# Patient Record
Sex: MALE | Race: ASIAN
[De-identification: names, ages, dates, MRNs, and addresses within clinical notes are randomized per-mention and may not be internally consistent; named-entity substitution may affect disease eponyms.]

---

## 2020-01-20 ENCOUNTER — TRANSCRIPTION ENCOUNTER (OUTPATIENT)
Age: 30
End: 2020-01-20

## 2020-02-28 ENCOUNTER — APPOINTMENT (OUTPATIENT)
Dept: INTERNAL MEDICINE | Facility: CLINIC | Age: 30
End: 2020-02-28
Payer: COMMERCIAL

## 2020-02-28 VITALS
SYSTOLIC BLOOD PRESSURE: 109 MMHG | HEIGHT: 71.26 IN | HEART RATE: 87 BPM | BODY MASS INDEX: 24.78 KG/M2 | DIASTOLIC BLOOD PRESSURE: 67 MMHG | TEMPERATURE: 97.4 F | OXYGEN SATURATION: 99 % | WEIGHT: 179 LBS

## 2020-02-28 DIAGNOSIS — Z78.9 OTHER SPECIFIED HEALTH STATUS: ICD-10-CM

## 2020-02-28 DIAGNOSIS — Z80.0 FAMILY HISTORY OF MALIGNANT NEOPLASM OF DIGESTIVE ORGANS: ICD-10-CM

## 2020-02-28 DIAGNOSIS — Z81.8 FAMILY HISTORY OF OTHER MENTAL AND BEHAVIORAL DISORDERS: ICD-10-CM

## 2020-02-28 DIAGNOSIS — H65.90 UNSPECIFIED NONSUPPURATIVE OTITIS MEDIA, UNSPECIFIED EAR: ICD-10-CM

## 2020-02-28 DIAGNOSIS — Z82.49 FAMILY HISTORY OF ISCHEMIC HEART DISEASE AND OTHER DISEASES OF THE CIRCULATORY SYSTEM: ICD-10-CM

## 2020-02-28 DIAGNOSIS — Z72.89 OTHER PROBLEMS RELATED TO LIFESTYLE: ICD-10-CM

## 2020-02-28 PROCEDURE — 99203 OFFICE O/P NEW LOW 30 MIN: CPT

## 2020-02-28 RX ORDER — ERYTHROMYCIN 5 MG/G
5 OINTMENT OPHTHALMIC
Qty: 4 | Refills: 0 | Status: COMPLETED | COMMUNITY
Start: 2020-01-21

## 2020-02-28 RX ORDER — FLUTICASONE PROPIONATE 50 UG/1
50 SPRAY, METERED NASAL TWICE DAILY
Qty: 1 | Refills: 0 | Status: ACTIVE | COMMUNITY
Start: 2020-02-28 | End: 1900-01-01

## 2020-02-28 RX ORDER — NEOMYCIN AND POLYMYXIN B SULFATES AND HYDROCORTISONE OTIC 10; 3.5; 1 MG/ML; MG/ML; [USP'U]/ML
3.5-10000-1 SUSPENSION AURICULAR (OTIC)
Qty: 10 | Refills: 0 | Status: COMPLETED | COMMUNITY
Start: 2019-10-31

## 2020-02-28 NOTE — REVIEW OF SYSTEMS
[Earache] : no earache [Hearing Loss] : no hearing loss [Negative] : Heme/Lymph [FreeTextEntry4] : tinnitus

## 2020-02-28 NOTE — PHYSICAL EXAM
[Normal Anterior Cervical Nodes] : no anterior cervical lymphadenopathy [Normal Posterior Cervical Nodes] : no posterior cervical lymphadenopathy [Normal] : affect was normal and insight and judgment were intact [de-identified] : no cerumen impaction

## 2020-02-28 NOTE — HISTORY OF PRESENT ILLNESS
[FreeTextEntry8] : 30 year old male patienrt came in with the complaint of ringing in the ear. \par Patient had similar complains last year, was found to have cerumen impaction, improved after removal. This episode started 2 months ago, stable, denies hearing loss, ear pain, discharge from ear. He admits to flying on airplane from [place] over a week ago.

## 2020-07-15 ENCOUNTER — APPOINTMENT (OUTPATIENT)
Dept: INTERNAL MEDICINE | Facility: CLINIC | Age: 30
End: 2020-07-15
Payer: COMMERCIAL

## 2020-07-15 ENCOUNTER — TRANSCRIPTION ENCOUNTER (OUTPATIENT)
Age: 30
End: 2020-07-15

## 2020-07-15 DIAGNOSIS — H00.014 HORDEOLUM EXTERNUM LEFT UPPER EYELID: ICD-10-CM

## 2020-07-15 PROCEDURE — 99213 OFFICE O/P EST LOW 20 MIN: CPT | Mod: 95

## 2020-07-15 RX ORDER — NEOMYCIN AND POLYMYXIN B SULFATES AND BACITRACIN ZINC 400; 3.5; 1 [USP'U]/G; MG/G; [USP'U]/G
3.5-400-1 OINTMENT OPHTHALMIC
Qty: 1 | Refills: 0 | Status: ACTIVE | COMMUNITY
Start: 2020-07-15 | End: 1900-01-01

## 2020-07-15 NOTE — HISTORY OF PRESENT ILLNESS
[Medical Office: ([facility])___] : at the medical office located in  [Other Location: e.g. School (Enter Location, City,State)___] : at [unfilled], at the time of the visit. [de-identified] : 30 year old male patient on televideo with lesion on eyelid/stye.\par Started 2 days ago, located on left upper eyelid, associated with redness, progressively getting biggre, patient has been using warm compresses on it but has been getting bigger, denies vision problems.  [Verbal consent obtained from patient] : the patient, [unfilled]

## 2020-07-17 ENCOUNTER — TRANSCRIPTION ENCOUNTER (OUTPATIENT)
Age: 30
End: 2020-07-17

## 2020-12-30 ENCOUNTER — NON-APPOINTMENT (OUTPATIENT)
Age: 30
End: 2020-12-30

## 2020-12-30 ENCOUNTER — APPOINTMENT (OUTPATIENT)
Dept: INTERNAL MEDICINE | Facility: CLINIC | Age: 30
End: 2020-12-30
Payer: COMMERCIAL

## 2020-12-30 VITALS
TEMPERATURE: 98.2 F | SYSTOLIC BLOOD PRESSURE: 114 MMHG | HEIGHT: 71.25 IN | DIASTOLIC BLOOD PRESSURE: 73 MMHG | BODY MASS INDEX: 25.61 KG/M2 | OXYGEN SATURATION: 99 % | WEIGHT: 185 LBS | HEART RATE: 91 BPM

## 2020-12-30 DIAGNOSIS — Z00.00 ENCOUNTER FOR GENERAL ADULT MEDICAL EXAMINATION W/OUT ABNORMAL FINDINGS: ICD-10-CM

## 2020-12-30 DIAGNOSIS — R09.81 NASAL CONGESTION: ICD-10-CM

## 2020-12-30 DIAGNOSIS — Z11.3 ENCOUNTER FOR SCREENING FOR INFECTIONS WITH A PREDOMINANTLY SEXUAL MODE OF TRANSMISSION: ICD-10-CM

## 2020-12-30 PROCEDURE — 99072 ADDL SUPL MATRL&STAF TM PHE: CPT

## 2020-12-30 PROCEDURE — 36415 COLL VENOUS BLD VENIPUNCTURE: CPT

## 2020-12-30 PROCEDURE — 99213 OFFICE O/P EST LOW 20 MIN: CPT | Mod: 25

## 2020-12-30 PROCEDURE — 93000 ELECTROCARDIOGRAM COMPLETE: CPT

## 2020-12-30 PROCEDURE — 99395 PREV VISIT EST AGE 18-39: CPT | Mod: 25

## 2020-12-31 ENCOUNTER — APPOINTMENT (OUTPATIENT)
Dept: OTOLARYNGOLOGY | Facility: CLINIC | Age: 30
End: 2020-12-31
Payer: COMMERCIAL

## 2020-12-31 VITALS
SYSTOLIC BLOOD PRESSURE: 104 MMHG | WEIGHT: 185 LBS | HEART RATE: 91 BPM | BODY MASS INDEX: 25.9 KG/M2 | HEIGHT: 71 IN | TEMPERATURE: 98.1 F | DIASTOLIC BLOOD PRESSURE: 64 MMHG | OXYGEN SATURATION: 98 %

## 2020-12-31 DIAGNOSIS — R19.6 HALITOSIS: ICD-10-CM

## 2020-12-31 DIAGNOSIS — H93.13 TINNITUS, BILATERAL: ICD-10-CM

## 2020-12-31 DIAGNOSIS — J34.2 DEVIATED NASAL SEPTUM: ICD-10-CM

## 2020-12-31 DIAGNOSIS — H61.23 IMPACTED CERUMEN, BILATERAL: ICD-10-CM

## 2020-12-31 DIAGNOSIS — Q30.9 CONGENITAL MALFORMATION OF NOSE, UNSPECIFIED: ICD-10-CM

## 2020-12-31 PROBLEM — R09.81 NASAL CONGESTION: Status: ACTIVE | Noted: 2020-12-30

## 2020-12-31 PROCEDURE — 92557 COMPREHENSIVE HEARING TEST: CPT

## 2020-12-31 PROCEDURE — 31231 NASAL ENDOSCOPY DX: CPT

## 2020-12-31 PROCEDURE — 92550 TYMPANOMETRY & REFLEX THRESH: CPT

## 2020-12-31 PROCEDURE — 99204 OFFICE O/P NEW MOD 45 MIN: CPT | Mod: 25

## 2020-12-31 PROCEDURE — 99072 ADDL SUPL MATRL&STAF TM PHE: CPT

## 2020-12-31 PROCEDURE — G0268 REMOVAL OF IMPACTED WAX MD: CPT

## 2020-12-31 RX ORDER — FLUTICASONE PROPIONATE 50 UG/1
50 SPRAY, METERED NASAL
Qty: 1 | Refills: 5 | Status: ACTIVE | COMMUNITY
Start: 2020-12-31 | End: 1900-01-01

## 2020-12-31 NOTE — CONSULT LETTER
[Dear  ___] : Dear  [unfilled], [Consult Letter:] : I had the pleasure of evaluating your patient, [unfilled]. [Please see my note below.] : Please see my note below. [Consult Closing:] : Thank you very much for allowing me to participate in the care of this patient.  If you have any questions, please do not hesitate to contact me. [Sincerely,] : Sincerely, [FreeTextEntry3] : SANDRA Lynn Jr, MD, FAAOHNS\par Otolaryngologist\par [facility]

## 2020-12-31 NOTE — PROCEDURE
[FreeTextEntry6] : We discussed the elevated risk of liberation of viral particles from the nasopharynx if the patient were to be asymptomatically infected with COVID-19; after weighing the risks & benefits the decision was made to proceed. The procedure was performed while wearing appropriate PPE and a camera attached to a video system was used to maximize separation from the patient. The scope was handled appropriately. \par Indication: requirement for exam not possible via anterior rhinoscopy; severe nasal obstruction & halitosis\par After verbal consent and the administration of an aerosolized phenylephrine/lidocaine mix, examination was performed with a flexible endoscope. Findings:\par Septum: wide R DNS impacting sidewall structures\par Mucosa: normal\par Polyposis: not present\par Inferior turbinates: unremarkable\par Middle and superior turbinates: normal\par Inferior meatus: unremarkable\par Middle meatus: unremarkable\par Superior meatus: unremarkable\par Speno-ethmoidal recess: unremarkable\par Nasopharynx: unremarkable\par Secretions: unremarkable\par Other findings: none\par  [de-identified] : Indication: requirement for exam not possible via anterior examination\par After verbal consent and the administration of an aerosolized phenylephrine/lidocaine mix, examination was performed with a flexible endoscope placed through the nose. Findings:\par Nasopharynx: unremarkable\par Soft palate, lateral and posterior pharyngeal walls: unremarkable\par Base of tongue & lingual tonsil: minimal retrodisplacement\par Vallecula: unremarkable\par Epiglottis: unremarkable\par Piriform sinuses and pharyngoesophageal junction: unremarkable\par Arytenoids and AE folds: mild-mod interarytenoid edema\par Ventricle and false vocal folds: unremarkable\par True vocal folds: Smooth free edge; surface without ectasias or edema; normal movement bilaterally with good apposition in phonation\par Visible subglottis: unremarkable\par Other findings: none\par

## 2020-12-31 NOTE — HISTORY OF PRESENT ILLNESS
[de-identified] : 30 year old male patient came in for preventive visit and concern for nasal congestion\par works as a \par , female partner, monogamous\par exercises but irregularly\par diet is healthy

## 2020-12-31 NOTE — HISTORY OF PRESENT ILLNESS
[de-identified] : R>L nonpulsatile tinnitus that began ~11/19 which improved after a cleaning but then recurred and he now hears it daily in quiet places. No subjective hearing loss. Denies: vertigo, ear pain, drainage, frequent loud noise exp/shooting, frequent infections, hx of ear surgery or IV antibiotics/chemo; denies a FHx of hearing loss. Qtip use: prior\par Also w/ alternating nasal congestion for years which improves w/ steam. No obvious allergy sxs. No frequent sinus infections or rhinorrhea but has some midfacial pressure. Lifelong twisted nasal appearance. \par Also c/o halitosis and a white colored tongue for many months. No significant heartburn. \par \par Covid-19 screening questions: \par   Sick contacts: no\par   Recent international travel: no\par   Shortness of breath: no\par   New or productive cough: no\par   Fevers/chills/night sweats: no\par   COVID-19 testing: none\par

## 2020-12-31 NOTE — HISTORY OF PRESENT ILLNESS
[de-identified] : R>L nonpulsatile tinnitus that began ~11/19 which improved after a cleaning but then recurred and he now hears it daily in quiet places. No subjective hearing loss. Denies: vertigo, ear pain, drainage, frequent loud noise exp/shooting, frequent infections, hx of ear surgery or IV antibiotics/chemo; denies a FHx of hearing loss. Qtip use: prior\par Also w/ alternating nasal congestion for years which improves w/ steam. No obvious allergy sxs. No frequent sinus infections or rhinorrhea but has some midfacial pressure. Lifelong twisted nasal appearance. \par Also c/o halitosis and a white colored tongue for many months. No significant heartburn. \par \par Covid-19 screening questions: \par   Sick contacts: no\par   Recent international travel: no\par   Shortness of breath: no\par   New or productive cough: no\par   Fevers/chills/night sweats: no\par   COVID-19 testing: none\par

## 2020-12-31 NOTE — PHYSICAL EXAM
[Binocular Microscopic Exam] : Binocular microscopic exam was performed [Nasal Endoscopy Performed] : nasal endoscopy was performed, see procedure section for findings [] : septum deviated to the right [Laryngoscopy Performed] : laryngoscopy was performed, see procedure section for findings [Normal] : no rashes [FreeTextEntry8] : obstructing cerumen removed with a loop [FreeTextEntry9] : obstructing cerumen removed with a loop [de-identified] : twisted apex left w/ mild bilat midvault pinching

## 2020-12-31 NOTE — ASSESSMENT
[FreeTextEntry1] : Trial flonase & RTC 1 month; discussed surgical options. \par RTC for any worsening or unilateral tinnitus. \par Discussed halitosis mgmt & reflux prevention

## 2020-12-31 NOTE — PROCEDURE
[FreeTextEntry6] : We discussed the elevated risk of liberation of viral particles from the nasopharynx if the patient were to be asymptomatically infected with COVID-19; after weighing the risks & benefits the decision was made to proceed. The procedure was performed while wearing appropriate PPE and a camera attached to a video system was used to maximize separation from the patient. The scope was handled appropriately. \par Indication: requirement for exam not possible via anterior rhinoscopy; severe nasal obstruction & halitosis\par After verbal consent and the administration of an aerosolized phenylephrine/lidocaine mix, examination was performed with a flexible endoscope. Findings:\par Septum: wide R DNS impacting sidewall structures\par Mucosa: normal\par Polyposis: not present\par Inferior turbinates: unremarkable\par Middle and superior turbinates: normal\par Inferior meatus: unremarkable\par Middle meatus: unremarkable\par Superior meatus: unremarkable\par Speno-ethmoidal recess: unremarkable\par Nasopharynx: unremarkable\par Secretions: unremarkable\par Other findings: none\par  [de-identified] : Indication: requirement for exam not possible via anterior examination\par After verbal consent and the administration of an aerosolized phenylephrine/lidocaine mix, examination was performed with a flexible endoscope placed through the nose. Findings:\par Nasopharynx: unremarkable\par Soft palate, lateral and posterior pharyngeal walls: unremarkable\par Base of tongue & lingual tonsil: minimal retrodisplacement\par Vallecula: unremarkable\par Epiglottis: unremarkable\par Piriform sinuses and pharyngoesophageal junction: unremarkable\par Arytenoids and AE folds: mild-mod interarytenoid edema\par Ventricle and false vocal folds: unremarkable\par True vocal folds: Smooth free edge; surface without ectasias or edema; normal movement bilaterally with good apposition in phonation\par Visible subglottis: unremarkable\par Other findings: none\par

## 2020-12-31 NOTE — CONSULT LETTER
[Dear  ___] : Dear  [unfilled], [Consult Letter:] : I had the pleasure of evaluating your patient, [unfilled]. [Please see my note below.] : Please see my note below. [Consult Closing:] : Thank you very much for allowing me to participate in the care of this patient.  If you have any questions, please do not hesitate to contact me. [Sincerely,] : Sincerely, [FreeTextEntry3] : SANDRA Lynn Jr, MD, FAAOHNS\par Otolaryngologist\par [facility]

## 2020-12-31 NOTE — PHYSICAL EXAM
[Binocular Microscopic Exam] : Binocular microscopic exam was performed [Nasal Endoscopy Performed] : nasal endoscopy was performed, see procedure section for findings [] : septum deviated to the right [Laryngoscopy Performed] : laryngoscopy was performed, see procedure section for findings [Normal] : no rashes [FreeTextEntry8] : obstructing cerumen removed with a loop [FreeTextEntry9] : obstructing cerumen removed with a loop [de-identified] : twisted apex left w/ mild bilat midvault pinching

## 2021-01-05 ENCOUNTER — TRANSCRIPTION ENCOUNTER (OUTPATIENT)
Age: 31
End: 2021-01-05

## 2021-01-05 LAB
ALBUMIN SERPL ELPH-MCNC: 4.6 G/DL
ALP BLD-CCNC: 62 U/L
ALT SERPL-CCNC: 20 U/L
ANION GAP SERPL CALC-SCNC: 13 MMOL/L
APPEARANCE: CLEAR
AST SERPL-CCNC: 20 U/L
BACTERIA: NEGATIVE
BASOPHILS # BLD AUTO: 0.07 K/UL
BASOPHILS NFR BLD AUTO: 1.3 %
BILIRUB SERPL-MCNC: 0.6 MG/DL
BILIRUBIN URINE: NEGATIVE
BLOOD URINE: NEGATIVE
BUN SERPL-MCNC: 12 MG/DL
C TRACH RRNA SPEC QL NAA+PROBE: NOT DETECTED
CALCIUM SERPL-MCNC: 10 MG/DL
CHLORIDE SERPL-SCNC: 99 MMOL/L
CHOLEST SERPL-MCNC: 245 MG/DL
CO2 SERPL-SCNC: 25 MMOL/L
COLOR: COLORLESS
CREAT SERPL-MCNC: 1.12 MG/DL
EOSINOPHIL # BLD AUTO: 0.06 K/UL
EOSINOPHIL NFR BLD AUTO: 1.1 %
ESTIMATED AVERAGE GLUCOSE: 100 MG/DL
GLUCOSE QUALITATIVE U: NEGATIVE
GLUCOSE SERPL-MCNC: 96 MG/DL
HBA1C MFR BLD HPLC: 5.1 %
HCT VFR BLD CALC: 44.9 %
HDLC SERPL-MCNC: 46 MG/DL
HGB BLD-MCNC: 14.9 G/DL
HIV1+2 AB SPEC QL IA.RAPID: NONREACTIVE
HYALINE CASTS: 0 /LPF
IMM GRANULOCYTES NFR BLD AUTO: 0.2 %
KETONES URINE: NEGATIVE
LDLC SERPL CALC-MCNC: 163 MG/DL
LEUKOCYTE ESTERASE URINE: NEGATIVE
LYMPHOCYTES # BLD AUTO: 1.64 K/UL
LYMPHOCYTES NFR BLD AUTO: 30.2 %
MAN DIFF?: NORMAL
MCHC RBC-ENTMCNC: 29.2 PG
MCHC RBC-ENTMCNC: 33.2 GM/DL
MCV RBC AUTO: 87.9 FL
MICROSCOPIC-UA: NORMAL
MONOCYTES # BLD AUTO: 0.3 K/UL
MONOCYTES NFR BLD AUTO: 5.5 %
N GONORRHOEA RRNA SPEC QL NAA+PROBE: NOT DETECTED
NEUTROPHILS # BLD AUTO: 3.35 K/UL
NEUTROPHILS NFR BLD AUTO: 61.7 %
NITRITE URINE: NEGATIVE
NONHDLC SERPL-MCNC: 199 MG/DL
PH URINE: 7
PLATELET # BLD AUTO: 216 K/UL
POTASSIUM SERPL-SCNC: 4.3 MMOL/L
PROT SERPL-MCNC: 6.8 G/DL
PROTEIN URINE: NEGATIVE
RBC # BLD: 5.11 M/UL
RBC # FLD: 11.2 %
RED BLOOD CELLS URINE: 0 /HPF
SODIUM SERPL-SCNC: 137 MMOL/L
SOURCE AMPLIFICATION: NORMAL
SPECIFIC GRAVITY URINE: 1
SQUAMOUS EPITHELIAL CELLS: 0 /HPF
T PALLIDUM AB SER QL IA: NEGATIVE
TRIGL SERPL-MCNC: 181 MG/DL
UROBILINOGEN URINE: NORMAL
WBC # FLD AUTO: 5.43 K/UL
WHITE BLOOD CELLS URINE: 0 /HPF

## 2021-01-25 ENCOUNTER — TRANSCRIPTION ENCOUNTER (OUTPATIENT)
Age: 31
End: 2021-01-25

## 2021-02-01 ENCOUNTER — APPOINTMENT (OUTPATIENT)
Dept: INTERNAL MEDICINE | Facility: CLINIC | Age: 31
End: 2021-02-01